# Patient Record
(demographics unavailable — no encounter records)

---

## 2024-10-07 NOTE — PLAN
[None] : None [FreeTextEntry1] : no restrictions at this time she is to follow up with dr. mathews for fertility i will send a dictated note and op report to him all of her questions were answered she is agreeable with plan      I Nathaly SPARROW am scribing for the presence of Dr. Lu the following sections HISTORY OF PRESENT ILLNESS, PAST MEDICAL/FAMILY/SOCIAL HISTORY; REVIEW OF SYSTEMS; VITAL SIGNS; PHYSICAL EXAM; DISPOSITION.    I personally performed the services described in the documentation, reviewed the documentation recorded by the scribe in my presence and it accurately and completely records my words and actions.

## 2024-10-07 NOTE — HISTORY OF PRESENT ILLNESS
[Pain is well-controlled] : pain is well-controlled [Clean/Dry/Intact] : clean, dry and intact [None] : no vaginal bleeding [Normal] : normal [Fever] : no fever [Chills] : no chills [Diarrhea] : no diarrhea [Vaginal Bleeding] : no vaginal bleeding [Vaginal Discharge] : no vaginal discharge [Constipation] : no constipation [Erythema] : not erythematous [Swelling] : not swollen [de-identified] : Patient is a 37 yo female here today for post op visit. she is s/p LESS BS for blocked fallopian tubes (left hydrosalpinx) on 8/22/2024